# Patient Record
Sex: MALE | Race: WHITE | NOT HISPANIC OR LATINO | URBAN - METROPOLITAN AREA
[De-identification: names, ages, dates, MRNs, and addresses within clinical notes are randomized per-mention and may not be internally consistent; named-entity substitution may affect disease eponyms.]

---

## 2018-10-03 ENCOUNTER — TELEMEDICINE2 (OUTPATIENT)
Dept: MEDICAL GROUP | Age: 43
End: 2018-10-03
Payer: COMMERCIAL

## 2018-10-03 ENCOUNTER — TELEMEDICINE ORIGINATING SITE VISIT (OUTPATIENT)
Dept: MEDICAL GROUP | Facility: CLINIC | Age: 43
End: 2018-10-03
Payer: COMMERCIAL

## 2018-10-03 VITALS
OXYGEN SATURATION: 93 % | TEMPERATURE: 97.5 F | HEIGHT: 74 IN | SYSTOLIC BLOOD PRESSURE: 142 MMHG | HEART RATE: 80 BPM | BODY MASS INDEX: 30.1 KG/M2 | DIASTOLIC BLOOD PRESSURE: 91 MMHG | WEIGHT: 234.5 LBS

## 2018-10-03 DIAGNOSIS — I10 ESSENTIAL HYPERTENSION: ICD-10-CM

## 2018-10-03 DIAGNOSIS — F41.9 ANXIETY: ICD-10-CM

## 2018-10-03 DIAGNOSIS — J45.20 MILD INTERMITTENT ASTHMA WITHOUT COMPLICATION: ICD-10-CM

## 2018-10-03 DIAGNOSIS — Z72.0 TOBACCO ABUSE: ICD-10-CM

## 2018-10-03 PROCEDURE — 99204 OFFICE O/P NEW MOD 45 MIN: CPT | Performed by: INTERNAL MEDICINE

## 2018-10-03 RX ORDER — LISINOPRIL 10 MG/1
10 TABLET ORAL DAILY
COMMUNITY

## 2018-10-03 RX ORDER — BUPROPION HYDROCHLORIDE 150 MG/1
TABLET, EXTENDED RELEASE ORAL
Qty: 60 TAB | Refills: 2 | Status: SHIPPED | OUTPATIENT
Start: 2018-10-03 | End: 2018-12-21 | Stop reason: SDUPTHER

## 2018-10-03 RX ORDER — BUDESONIDE AND FORMOTEROL FUMARATE DIHYDRATE 160; 4.5 UG/1; UG/1
2 AEROSOL RESPIRATORY (INHALATION) 2 TIMES DAILY
COMMUNITY
End: 2018-12-21 | Stop reason: SDUPTHER

## 2018-10-03 ASSESSMENT — ENCOUNTER SYMPTOMS
ABDOMINAL PAIN: 0
COUGH: 0
DIZZINESS: 0
ORTHOPNEA: 0
WEIGHT LOSS: 1
BLOOD IN STOOL: 0
SHORTNESS OF BREATH: 0
CARDIOVASCULAR NEGATIVE: 1
WEAKNESS: 0
CHILLS: 0
SENSORY CHANGE: 0
RESPIRATORY NEGATIVE: 1
MUSCULOSKELETAL NEGATIVE: 1
EYES NEGATIVE: 1
DIARRHEA: 0
NERVOUS/ANXIOUS: 1
MYALGIAS: 0
GASTROINTESTINAL NEGATIVE: 1
VOMITING: 0
FEVER: 0
HEADACHES: 0
PND: 0
NAUSEA: 0
PALPITATIONS: 0

## 2018-10-03 ASSESSMENT — PATIENT HEALTH QUESTIONNAIRE - PHQ9: CLINICAL INTERPRETATION OF PHQ2 SCORE: 0

## 2018-10-04 NOTE — PROGRESS NOTES
"Subjective:      Gelacio Robbins is a 43 y.o. male who presents with Other ([facility])            HPI 43-year-old male is here today to establish medical care.  Recently moved to [place] from [place] 2 weeks ago for work. Patient has a wife and children still in [place].    1. Hypertension.  Patient stable on lisinopril 10 mg daily. Blood pressure average 120-130/80.    2. Mild intermittent asthma  Recently diagnosed with pulmonary function testing in [place]. Patient was given Symbicort and is stable.    3. Tobacco abuse  Patient has smoked for many years, was able to quit cold turkey in 2006. Patient has tried Chantix in the past but had side effects of nightmares and depression on weaning off. Patient would like an alternative.     4. Preventative  Last completed in June with his PCP. Patient will transfer records      Review of Systems   Constitutional: Positive for weight loss. Negative for chills, fever and malaise/fatigue.        Intentional 30 pound weight loss   HENT: Negative.  Negative for congestion and ear pain.    Eyes: Negative.    Respiratory: Negative.  Negative for cough and shortness of breath.    Cardiovascular: Negative.  Negative for chest pain, palpitations, orthopnea, leg swelling and PND.   Gastrointestinal: Negative.  Negative for abdominal pain, blood in stool, diarrhea, melena, nausea and vomiting.   Genitourinary: Negative.  Negative for frequency and urgency.   Musculoskeletal: Negative.  Negative for joint pain and myalgias.   Skin: Negative.  Negative for rash.   Neurological: Negative for dizziness, sensory change, weakness and headaches.   Psychiatric/Behavioral: The patient is nervous/anxious.    All other systems reviewed and are negative.         Objective:     /91 (BP Location: Right arm, Patient Position: Sitting)   Pulse 80   Temp 36.4 °C (97.5 °F) (Temporal)   Ht 1.88 m (6' 2\")   Wt 106.4 kg (234 lb 8 oz)   SpO2 93%   BMI 30.11 kg/m²      Physical Exam   Constitutional: " He is oriented to person, place, and time. He appears well-developed and well-nourished. No distress.   HENT:   Head: Normocephalic and atraumatic.   Right Ear: External ear normal.   Left Ear: External ear normal.   Nose: Nose normal.   Mouth/Throat: No oropharyngeal exudate.   Eyes: Pupils are equal, round, and reactive to light. Conjunctivae and EOM are normal. No scleral icterus.   Neck: Normal range of motion. Neck supple. No thyromegaly present.   Cardiovascular: Normal rate, regular rhythm, S1 normal, S2 normal, normal heart sounds and intact distal pulses.  Exam reveals no gallop.    Pulmonary/Chest: Effort normal and breath sounds normal. No respiratory distress. He has no wheezes. He has no rhonchi. He has no rales.   Abdominal: Soft. Bowel sounds are normal. He exhibits no distension. There is no hepatosplenomegaly, splenomegaly or hepatomegaly. There is no tenderness. There is no rebound and no guarding.   Musculoskeletal: Normal range of motion. He exhibits no edema.   Lymphadenopathy:     He has no cervical adenopathy.   Neurological: He is alert and oriented to person, place, and time. He has normal strength and normal reflexes. No cranial nerve deficit or sensory deficit. Gait normal.   Skin: Skin is warm and dry.   Psychiatric: He has a normal mood and affect. His behavior is normal. Judgment normal.   Nursing note and vitals reviewed.              Assessment/Plan:     1. Essential hypertension  Continue lisinopril 10 mg daily    2. Mild intermittent asthma without complication  Stable on Symbicort    3. Tobacco abuse  Offered counseling on smoking cessation  Trial with Wellbutrin    - buPROPion SR (WELLBUTRIN-SR) 150 MG TABLET SR 12 HR sustained-release tablet; Take one tablet QAM x 5 days then 2 tabs QAM  Dispense: 60 Tab; Refill: 2    4. Anxiety  Patient admits to mild anxiety as family is still in [place]    - buPROPion SR (WELLBUTRIN-SR) 150 MG TABLET SR 12 HR sustained-release tablet; Take one  tablet QAM x 5 days then 2 tabs QAM  Dispense: 60 Tab; Refill: 2

## 2018-10-09 ENCOUNTER — NON-PROVIDER VISIT (OUTPATIENT)
Dept: MEDICAL GROUP | Facility: CLINIC | Age: 43
End: 2018-10-09

## 2018-10-09 DIAGNOSIS — Z02.83 ENCOUNTER FOR DRUG SCREENING: ICD-10-CM

## 2018-10-09 PROCEDURE — 8907 PR URINE COLLECT ONLY: Performed by: FAMILY MEDICINE

## 2018-10-09 NOTE — NON-PROVIDER
Gelacio Robbins is a 43 y.o. male here for a non-provider visit for UDS Collect Only Pre-Emp NDOT    If abnormal was an in office provider notified today (if so, indicate provider)? Yes  Routed to PCP? Yes

## 2018-10-12 ENCOUNTER — NON-PROVIDER VISIT (OUTPATIENT)
Dept: MEDICAL GROUP | Facility: CLINIC | Age: 43
End: 2018-10-12

## 2018-10-12 DIAGNOSIS — Z02.4 ENCOUNTER FOR CDL (COMMERCIAL DRIVING LICENSE) EXAM: ICD-10-CM

## 2018-10-12 PROCEDURE — 8907 PR URINE COLLECT ONLY: Performed by: FAMILY MEDICINE

## 2018-10-12 NOTE — NON-PROVIDER
Gelacio Robbins is a 43 y.o. 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 Yes  Routed to PCP? Yes

## 2018-10-29 ENCOUNTER — TELEMEDICINE2 (OUTPATIENT)
Dept: MEDICAL GROUP | Age: 43
End: 2018-10-29
Payer: COMMERCIAL

## 2018-10-29 ENCOUNTER — TELEMEDICINE ORIGINATING SITE VISIT (OUTPATIENT)
Dept: MEDICAL GROUP | Facility: CLINIC | Age: 43
End: 2018-10-29
Payer: COMMERCIAL

## 2018-10-29 VITALS
DIASTOLIC BLOOD PRESSURE: 94 MMHG | TEMPERATURE: 97.5 F | BODY MASS INDEX: 29.77 KG/M2 | WEIGHT: 232 LBS | SYSTOLIC BLOOD PRESSURE: 148 MMHG | HEIGHT: 74 IN | OXYGEN SATURATION: 95 % | HEART RATE: 95 BPM

## 2018-10-29 DIAGNOSIS — Z72.0 TOBACCO ABUSE: ICD-10-CM

## 2018-10-29 DIAGNOSIS — I10 ESSENTIAL HYPERTENSION: ICD-10-CM

## 2018-10-29 DIAGNOSIS — J45.20 MILD INTERMITTENT ASTHMA WITHOUT COMPLICATION: ICD-10-CM

## 2018-10-29 PROCEDURE — 99214 OFFICE O/P EST MOD 30 MIN: CPT | Performed by: INTERNAL MEDICINE

## 2018-10-29 NOTE — PROGRESS NOTES
"CC: Follow-up smoking cessation    Verified identification with patient. Secured video conference with RN presenter in [facility]      HPI:   Gelacio presents today with the following.    1. Essential hypertension  Patient treated with lisinopril 10 mg daily.  Blood pressure range on average 140/90.  Patient is more active and [place], lost 2 pounds since last visit    2. Tobacco abuse  Currently taking Wellbutrin 300 mg daily since October 3.  Patient had a quit date, stopped smoking for 5 days however due to family issues his anxiety level has increased his stepfather recently had a heart attack.  Patient is now smoking less than half a pack of cigarettes a day, down from 1 pack of cigarettes a day.    3. Mild intermittent asthma without complication  Medical records arrived from [facility] in [place].  Patient completed a pulmonary function test, results showed positive asthma.  Patient using Symbicort and albuterol as needed.  No complaints.      Patient Active Problem List    Diagnosis Date Noted   • Essential hypertension 10/03/2018   • Mild intermittent asthma without complication 10/03/2018   • Tobacco abuse 10/03/2018       Current Outpatient Prescriptions   Medication Sig Dispense Refill   • buPROPion SR (WELLBUTRIN-SR) 150 MG TABLET SR 12 HR sustained-release tablet Take one tablet QAM x 5 days then 2 tabs QAM 60 Tab 2   • budesonide-formoterol (SYMBICORT) 160-4.5 MCG/ACT Aerosol Inhale 2 Puffs by mouth 2 Times a Day.     • lisinopril (PRINIVIL) 10 MG Tab Take 10 mg by mouth every day.       No current facility-administered medications for this visit.          Allergies as of 10/29/2018   • (No Known Allergies)        ROS: As per HPI.  Denies all other cardiopulmonary, GI, neurologic symptoms.    /94 (BP Location: Right arm, Patient Position: Sitting)   Pulse 95   Temp 36.4 °C (97.5 °F) (Tympanic)   Ht 1.88 m (6' 2\")   Wt 105.2 kg (232 lb)   SpO2 95%   BMI 29.79 kg/m²     Physical " Exam:  Gen:         Alert and oriented, No apparent distress.  Neck:        No Lymphadenopathy or Bruits.  Lungs:     Clear to auscultation bilaterally, no wheezes rhonchi or crackles  CV:          Regular rate and rhythm. No murmurs, rubs or gallops.  Abd:         Soft non tender, non distended. Normal active bowel sounds.  No  Hepatosplenomegaly, No pulsatile masses.                   Ext:          No clubbing, cyanosis, edema.      Assessment and Plan.   43 y.o. male with the following issues.    1. Essential hypertension  Keep blood pressure log  Adjust lisinopril as needed on next visit  RTC in 6 weeks    2. Tobacco abuse  Continue Wellbutrin 300 mg daily  Counseled patient on smoking cessation, resources  Stress management discussed    3. Mild intermittent asthma without complication  Stable with Symbicort and albuterol as needed            Please note that this dictation was created using voice recognition software. I have made every reasonable attempt to correct obvious errors, but expect that there are errors of grammar and possible content that I did not discover before finalizing note.

## 2018-12-21 ENCOUNTER — TELEMEDICINE ORIGINATING SITE VISIT (OUTPATIENT)
Dept: MEDICAL GROUP | Facility: CLINIC | Age: 43
End: 2018-12-21
Payer: COMMERCIAL

## 2018-12-21 ENCOUNTER — TELEMEDICINE2 (OUTPATIENT)
Dept: MEDICAL GROUP | Age: 43
End: 2018-12-21
Payer: COMMERCIAL

## 2018-12-21 VITALS
HEIGHT: 74 IN | DIASTOLIC BLOOD PRESSURE: 85 MMHG | SYSTOLIC BLOOD PRESSURE: 154 MMHG | WEIGHT: 232 LBS | OXYGEN SATURATION: 95 % | HEART RATE: 91 BPM | BODY MASS INDEX: 29.77 KG/M2 | TEMPERATURE: 98.3 F

## 2018-12-21 DIAGNOSIS — I10 ESSENTIAL HYPERTENSION: ICD-10-CM

## 2018-12-21 DIAGNOSIS — J45.20 MILD INTERMITTENT ASTHMA WITHOUT COMPLICATION: ICD-10-CM

## 2018-12-21 DIAGNOSIS — Z72.0 TOBACCO ABUSE: ICD-10-CM

## 2018-12-21 DIAGNOSIS — F41.9 ANXIETY: ICD-10-CM

## 2018-12-21 PROCEDURE — 99214 OFFICE O/P EST MOD 30 MIN: CPT | Performed by: INTERNAL MEDICINE

## 2018-12-21 RX ORDER — BUPROPION HYDROCHLORIDE 150 MG/1
TABLET, EXTENDED RELEASE ORAL
Qty: 60 TAB | Refills: 2 | Status: SHIPPED | OUTPATIENT
Start: 2018-12-21 | End: 2019-07-19 | Stop reason: SDUPTHER

## 2018-12-21 RX ORDER — BUDESONIDE AND FORMOTEROL FUMARATE DIHYDRATE 160; 4.5 UG/1; UG/1
2 AEROSOL RESPIRATORY (INHALATION) 2 TIMES DAILY
Qty: 1 INHALER | Refills: 9 | Status: SHIPPED | OUTPATIENT
Start: 2018-12-21 | End: 2019-08-07 | Stop reason: SDUPTHER

## 2018-12-21 RX ORDER — LISINOPRIL 20 MG/1
20 TABLET ORAL DAILY
Qty: 30 TAB | Refills: 5 | Status: SHIPPED | OUTPATIENT
Start: 2018-12-21 | End: 2019-07-19 | Stop reason: SDUPTHER

## 2018-12-21 RX ORDER — IBUPROFEN 800 MG/1
800 TABLET ORAL EVERY 8 HOURS PRN
COMMUNITY

## 2018-12-21 NOTE — PROGRESS NOTES
"CC: Follow-up hypertension    Verified identification with patient. Secured video conference with RN presenter in [facility]      HPI:   Gelacio presents today with the following.    1. Essential hypertension  Patient was started on lisinopril 10 mg daily on last visit.  Patient has noted that his blood pressure stabilized on average of 150/95.  Patient denies chest pain, shortness of breath, headache, dizziness, palpitations or edema.  Tolerating lisinopril without any side effects.    2. Mild intermittent asthma without complication  Requesting refill on Symbicort.  He is stable without any recent exacerbations.    3. Tobacco abuse  Patient would like to refill his Wellbutrin that he uses for smoking cessation.  He is down to 5 cigarettes a day.      Patient Active Problem List    Diagnosis Date Noted   • Essential hypertension 10/03/2018   • Mild intermittent asthma without complication 10/03/2018   • Tobacco abuse 10/03/2018       Current Outpatient Prescriptions   Medication Sig Dispense Refill   • ibuprofen (MOTRIN) 800 MG Tab Take 800 mg by mouth every 8 hours as needed.     • budesonide-formoterol (SYMBICORT) 160-4.5 MCG/ACT Aerosol Inhale 2 Puffs by mouth 2 Times a Day. 1 Inhaler 9   • buPROPion SR (WELLBUTRIN-SR) 150 MG TABLET SR 12 HR sustained-release tablet Take one tablet QAM x 5 days then 2 tabs QAM 60 Tab 2   • lisinopril (PRINIVIL) 20 MG Tab Take 1 Tab by mouth every day. 30 Tab 5   • lisinopril (PRINIVIL) 10 MG Tab Take 10 mg by mouth every day.       No current facility-administered medications for this visit.          Allergies as of 12/21/2018   • (No Known Allergies)        ROS: As per HPI.  Denies all other cardiopulmonary, GI, neurologic symptoms.    /85 (BP Location: Right arm, Patient Position: Sitting)   Pulse 91   Temp 36.8 °C (98.3 °F) (Tympanic)   Ht 1.88 m (6' 2\")   Wt 105.2 kg (232 lb)   SpO2 95%   BMI 29.79 kg/m²     Physical Exam:  Gen:         Alert and oriented, No " apparent distress.  Neck:        No Lymphadenopathy or Bruits.  Lungs:     Clear to auscultation bilaterally, no wheezes rhonchi or crackles  CV:          Regular rate and rhythm. No murmurs, rubs or gallops.  Abd:         Soft non tender, non distended.                   Ext:          No clubbing, cyanosis, edema.      Assessment and Plan.   43 y.o. male with the following issues.    1. Essential hypertension  Increase lisinopril to 20 mg daily  Keep blood pressure log, RTC if blood pressure does not come below 140/90    - lisinopril (PRINIVIL) 20 MG Tab; Take 1 Tab by mouth every day.  Dispense: 30 Tab; Refill: 5    2. Mild intermittent asthma without complication  Stable    - budesonide-formoterol (SYMBICORT) 160-4.5 MCG/ACT Aerosol; Inhale 2 Puffs by mouth 2 Times a Day.  Dispense: 1 Inhaler; Refill: 9    3. Tobacco abuse  Continue weaning off of cigarettes, counseled patient on smoking cessation  Patient will return when he stopped smoking to wean off of Wellbutrin    - buPROPion SR (WELLBUTRIN-SR) 150 MG TABLET SR 12 HR sustained-release tablet; Take one tablet QAM x 5 days then 2 tabs QAM  Dispense: 60 Tab; Refill: 2            Please note that this dictation was created using voice recognition software. I have made every reasonable attempt to correct obvious errors, but expect that there are errors of grammar and possible content that I did not discover before finalizing note.

## 2019-01-30 ENCOUNTER — OFFICE VISIT (OUTPATIENT)
Dept: OCCUPATIONAL MEDICINE | Facility: CLINIC | Age: 44
End: 2019-01-30

## 2019-01-30 VITALS
HEIGHT: 74 IN | OXYGEN SATURATION: 94 % | WEIGHT: 231 LBS | RESPIRATION RATE: 16 BRPM | SYSTOLIC BLOOD PRESSURE: 143 MMHG | TEMPERATURE: 98.6 F | BODY MASS INDEX: 29.65 KG/M2 | DIASTOLIC BLOOD PRESSURE: 84 MMHG | HEART RATE: 85 BPM

## 2019-01-30 DIAGNOSIS — Z02.4 ENCOUNTER FOR COMMERCIAL DRIVER MEDICAL EXAMINATION (CDME): ICD-10-CM

## 2019-01-30 PROCEDURE — 7100 PR DOT PHYSICAL: Performed by: PREVENTIVE MEDICINE

## 2019-01-30 ASSESSMENT — VISUAL ACUITY
OD_CC: 20/20
OS_CC: 20/15

## 2019-07-19 ENCOUNTER — TELEMEDICINE2 (OUTPATIENT)
Dept: MEDICAL GROUP | Age: 44
End: 2019-07-19
Payer: COMMERCIAL

## 2019-07-19 ENCOUNTER — TELEMEDICINE ORIGINATING SITE VISIT (OUTPATIENT)
Dept: MEDICAL GROUP | Facility: CLINIC | Age: 44
End: 2019-07-19
Payer: COMMERCIAL

## 2019-07-19 VITALS
WEIGHT: 224 LBS | OXYGEN SATURATION: 96 % | RESPIRATION RATE: 16 BRPM | HEIGHT: 74 IN | SYSTOLIC BLOOD PRESSURE: 128 MMHG | DIASTOLIC BLOOD PRESSURE: 87 MMHG | HEART RATE: 89 BPM | BODY MASS INDEX: 28.75 KG/M2 | TEMPERATURE: 99.2 F

## 2019-07-19 DIAGNOSIS — I10 ESSENTIAL HYPERTENSION: ICD-10-CM

## 2019-07-19 DIAGNOSIS — F41.9 ANXIETY: ICD-10-CM

## 2019-07-19 DIAGNOSIS — Z72.0 TOBACCO ABUSE: ICD-10-CM

## 2019-07-19 PROCEDURE — 99214 OFFICE O/P EST MOD 30 MIN: CPT | Performed by: INTERNAL MEDICINE

## 2019-07-19 RX ORDER — LISINOPRIL 20 MG/1
20 TABLET ORAL DAILY
Qty: 30 TAB | Refills: 5 | Status: SHIPPED | OUTPATIENT
Start: 2019-07-19 | End: 2019-08-07 | Stop reason: SDUPTHER

## 2019-07-19 RX ORDER — BUPROPION HYDROCHLORIDE 150 MG/1
TABLET, EXTENDED RELEASE ORAL
Qty: 60 TAB | Refills: 5 | Status: SHIPPED | OUTPATIENT
Start: 2019-07-19 | End: 2019-08-07 | Stop reason: SDUPTHER

## 2019-07-19 NOTE — PROGRESS NOTES
CC: 6-month follow-up visit    Verified identification with patient. Secured video conference with RN presenter in [facility]      HPI:   Gelacio presents today with the following.    1. Essential hypertension  Patient requesting refill on lisinopril 20 mg daily.  Patient doing well with his medications, blood pressure stable.  Tolerating medication well.    2. Tobacco abuse  Patient was started on Wellbutrin to help with smoking cessation in December 2018.  Patient weaned him off of Wellbutrin in May.  Patient was able to almost quit however due to recent stressors is smoking quite a bit currently.  Patient would like to restart Wellbutrin.    3. Anxiety  Patient moved here from [place] secondary to his job.  Patient's wife and kids will be moving to [place] soon and his wife is looking for work.  Patient has been under lots of stress with work and family issues and has started smoking once again and also gambling.    Prevention: Preventative labs completed in [facility] in October 2018.  Results available in media.  Within good range.    Patient Active Problem List    Diagnosis Date Noted   • Essential hypertension 10/03/2018   • Mild intermittent asthma without complication 10/03/2018   • Tobacco abuse 10/03/2018       Current Outpatient Prescriptions   Medication Sig Dispense Refill   • lisinopril (PRINIVIL) 20 MG Tab Take 1 Tab by mouth every day. 30 Tab 5   • buPROPion SR (WELLBUTRIN-SR) 150 MG TABLET SR 12 HR sustained-release tablet Take one tablet QAM x 5 days then 2 tabs QAM 60 Tab 5   • ibuprofen (MOTRIN) 800 MG Tab Take 800 mg by mouth every 8 hours as needed.     • budesonide-formoterol (SYMBICORT) 160-4.5 MCG/ACT Aerosol Inhale 2 Puffs by mouth 2 Times a Day. 1 Inhaler 9   • lisinopril (PRINIVIL) 10 MG Tab Take 10 mg by mouth every day.       No current facility-administered medications for this visit.          Allergies as of 07/19/2019   • (No Known Allergies)        ROS: As per HPI.  Denies  "cardiopulmonary, GI, neurologic symptoms.    /87 (BP Location: Left arm, Patient Position: Sitting, BP Cuff Size: Adult long)   Pulse 89   Temp 37.3 °C (99.2 °F) (Temporal)   Resp 16   Ht 1.88 m (6' 2\")   Wt 101.6 kg (224 lb)   SpO2 96%   BMI 28.76 kg/m²     Physical Exam:  Gen:         Alert and oriented, No apparent distress.  No physical exam today    Assessment and Plan.   44 y.o. male with the following issues.    1. Essential hypertension  Stable    - lisinopril (PRINIVIL) 20 MG Tab; Take 1 Tab by mouth every day.  Dispense: 30 Tab; Refill: 5    2. Tobacco abuse  Offered counseling for smoking cessation  Discussed resources  Restart Wellbutrin    - buPROPion SR (WELLBUTRIN-SR) 150 MG TABLET SR 12 HR sustained-release tablet; Take one tablet QAM x 5 days then 2 tabs QAM  Dispense: 60 Tab; Refill: 5    3. Anxiety  Offered counseling on managing anxiety, stress management    - buPROPion SR (WELLBUTRIN-SR) 150 MG TABLET SR 12 HR sustained-release tablet; Take one tablet QAM x 5 days then 2 tabs QAM  Dispense: 60 Tab; Refill: 5    Total visit time, 25 minutes, time spent in medication management, counseling.        Please note that this dictation was created using voice recognition software. I have made every reasonable attempt to correct obvious errors, but expect that there are errors of grammar and possible content that I did not discover before finalizing note.   "

## 2019-08-06 DIAGNOSIS — Z72.0 TOBACCO ABUSE: ICD-10-CM

## 2019-08-06 DIAGNOSIS — I10 ESSENTIAL HYPERTENSION: ICD-10-CM

## 2019-08-06 DIAGNOSIS — J45.20 MILD INTERMITTENT ASTHMA WITHOUT COMPLICATION: ICD-10-CM

## 2019-08-06 DIAGNOSIS — F41.9 ANXIETY: ICD-10-CM

## 2019-08-06 NOTE — TELEPHONE ENCOUNTER
Was the patient seen in the last year in this department? Yes    Does patient have an active prescription for medications requested? Yes    Received Request Via: Pharmacy     Requested Prescriptions     Pending Prescriptions Disp Refills   • lisinopril (PRINIVIL) 20 MG Tab 30 Tab 5     Sig: Take 1 Tab by mouth every day.   • buPROPion SR (WELLBUTRIN-SR) 150 MG TABLET SR 12 HR sustained-release tablet 60 Tab 5     Sig: Take one tablet QAM x 5 days then 2 tabs QAM   • budesonide-formoterol (SYMBICORT) 160-4.5 MCG/ACT Aerosol 1 Inhaler 9     Sig: Inhale 2 Puffs by mouth 2 Times a Day.

## 2019-08-07 RX ORDER — BUPROPION HYDROCHLORIDE 150 MG/1
TABLET, EXTENDED RELEASE ORAL
Qty: 60 TAB | Refills: 5 | Status: SHIPPED | OUTPATIENT
Start: 2019-08-07

## 2019-08-07 RX ORDER — LISINOPRIL 20 MG/1
20 TABLET ORAL DAILY
Qty: 30 TAB | Refills: 5 | Status: SHIPPED | OUTPATIENT
Start: 2019-08-07

## 2019-08-07 RX ORDER — BUDESONIDE AND FORMOTEROL FUMARATE DIHYDRATE 160; 4.5 UG/1; UG/1
2 AEROSOL RESPIRATORY (INHALATION) 2 TIMES DAILY
Qty: 1 INHALER | Refills: 9 | Status: SHIPPED | OUTPATIENT
Start: 2019-08-07

## 2021-02-26 ENCOUNTER — TELEPHONE (OUTPATIENT)
Dept: MEDICAL GROUP | Facility: PHYSICIAN GROUP | Age: 46
End: 2021-02-26

## 2021-02-26 NOTE — TELEPHONE ENCOUNTER
Phone Number Called: [phone] (home)     Call outcome: Left detailed message for patient. Informed to call back with any additional questions.    Message: pt will need to establish with a new pcp, due to insurance